# Patient Record
Sex: FEMALE | Race: WHITE | Employment: FULL TIME | URBAN - NONMETROPOLITAN AREA
[De-identification: names, ages, dates, MRNs, and addresses within clinical notes are randomized per-mention and may not be internally consistent; named-entity substitution may affect disease eponyms.]

---

## 2017-08-10 ENCOUNTER — TELEPHONE (OUTPATIENT)
Dept: FAMILY MEDICINE CLINIC | Age: 37
End: 2017-08-10

## 2017-08-18 ENCOUNTER — OFFICE VISIT (OUTPATIENT)
Dept: FAMILY MEDICINE CLINIC | Age: 37
End: 2017-08-18

## 2017-08-18 VITALS
HEART RATE: 70 BPM | DIASTOLIC BLOOD PRESSURE: 86 MMHG | BODY MASS INDEX: 36.25 KG/M2 | WEIGHT: 197 LBS | HEIGHT: 62 IN | OXYGEN SATURATION: 99 % | SYSTOLIC BLOOD PRESSURE: 124 MMHG

## 2017-08-18 DIAGNOSIS — Z00.00 PHYSICAL EXAM: ICD-10-CM

## 2017-08-18 DIAGNOSIS — Z23 NEED FOR TDAP VACCINATION: ICD-10-CM

## 2017-08-18 DIAGNOSIS — Z76.89 ENCOUNTER TO ESTABLISH CARE: Primary | ICD-10-CM

## 2017-08-18 PROCEDURE — 99385 PREV VISIT NEW AGE 18-39: CPT | Performed by: NURSE PRACTITIONER

## 2017-08-18 PROCEDURE — 90715 TDAP VACCINE 7 YRS/> IM: CPT | Performed by: NURSE PRACTITIONER

## 2017-08-18 PROCEDURE — 90471 IMMUNIZATION ADMIN: CPT | Performed by: NURSE PRACTITIONER

## 2017-08-18 ASSESSMENT — ENCOUNTER SYMPTOMS
BLURRED VISION: 0
STRIDOR: 0
PHOTOPHOBIA: 0
DOUBLE VISION: 0
EYE DISCHARGE: 0
EYE PAIN: 0
BLOOD IN STOOL: 0
EYES NEGATIVE: 1
HEARTBURN: 0
NAUSEA: 0
WHEEZING: 0
COUGH: 0
ORTHOPNEA: 0
GASTROINTESTINAL NEGATIVE: 1
VOMITING: 0
SPUTUM PRODUCTION: 0
SHORTNESS OF BREATH: 0
ABDOMINAL PAIN: 0
BACK PAIN: 0
RESPIRATORY NEGATIVE: 1
HEMOPTYSIS: 0
DIARRHEA: 0
CONSTIPATION: 0
SORE THROAT: 1
EYE REDNESS: 0

## 2017-08-18 ASSESSMENT — PATIENT HEALTH QUESTIONNAIRE - PHQ9
1. LITTLE INTEREST OR PLEASURE IN DOING THINGS: 0
2. FEELING DOWN, DEPRESSED OR HOPELESS: 0
SUM OF ALL RESPONSES TO PHQ9 QUESTIONS 1 & 2: 0
SUM OF ALL RESPONSES TO PHQ QUESTIONS 1-9: 0

## 2017-08-21 ENCOUNTER — HOSPITAL ENCOUNTER (OUTPATIENT)
Dept: GENERAL RADIOLOGY | Age: 37
Discharge: HOME | End: 2017-08-21
Attending: NURSE PRACTITIONER | Admitting: NURSE PRACTITIONER

## 2017-08-21 DIAGNOSIS — Z76.89 ENCOUNTER TO ESTABLISH CARE: ICD-10-CM

## 2017-08-21 DIAGNOSIS — Z00.00 PHYSICAL EXAM: ICD-10-CM

## 2017-08-21 LAB
ALBUMIN SERPL-MCNC: 4.6 G/DL (ref 3.4–5)
ALP BLD-CCNC: 72 U/L (ref 40–129)
ALT SERPL-CCNC: 14 U/L (ref 10–40)
ANION GAP SERPL CALCULATED.3IONS-SCNC: 13 MMOL/L (ref 3–16)
AST SERPL-CCNC: 15 U/L (ref 15–37)
BASOPHILS ABSOLUTE: 0 K/UL (ref 0–0.2)
BASOPHILS RELATIVE PERCENT: 0.6 %
BILIRUB SERPL-MCNC: 0.4 MG/DL (ref 0–1)
BILIRUBIN DIRECT: <0.2 MG/DL (ref 0–0.3)
BILIRUBIN, INDIRECT: NORMAL MG/DL (ref 0–1)
BUN BLDV-MCNC: 7 MG/DL (ref 7–20)
CALCIUM SERPL-MCNC: 9.1 MG/DL (ref 8.3–10.6)
CHLORIDE BLD-SCNC: 98 MMOL/L (ref 99–110)
CO2: 25 MMOL/L (ref 21–32)
CREAT SERPL-MCNC: <0.5 MG/DL (ref 0.6–1.1)
EOSINOPHILS ABSOLUTE: 0.1 K/UL (ref 0–0.6)
EOSINOPHILS RELATIVE PERCENT: 0.9 %
GFR AFRICAN AMERICAN: >60
GFR NON-AFRICAN AMERICAN: >60
GLUCOSE BLD-MCNC: 86 MG/DL (ref 70–99)
HCT VFR BLD CALC: 38.7 % (ref 36–48)
HEMOGLOBIN: 13 G/DL (ref 12–16)
LYMPHOCYTES ABSOLUTE: 1.8 K/UL (ref 1–5.1)
LYMPHOCYTES RELATIVE PERCENT: 32.2 %
MCH RBC QN AUTO: 30 PG (ref 26–34)
MCHC RBC AUTO-ENTMCNC: 33.5 G/DL (ref 31–36)
MCV RBC AUTO: 89.3 FL (ref 80–100)
MONOCYTES ABSOLUTE: 0.3 K/UL (ref 0–1.3)
MONOCYTES RELATIVE PERCENT: 4.9 %
NEUTROPHILS ABSOLUTE: 3.5 K/UL (ref 1.7–7.7)
NEUTROPHILS RELATIVE PERCENT: 61.4 %
PDW BLD-RTO: 13.6 % (ref 12.4–15.4)
PLATELET # BLD: 279 K/UL (ref 135–450)
PMV BLD AUTO: 8.5 FL (ref 5–10.5)
POTASSIUM SERPL-SCNC: 3.7 MMOL/L (ref 3.5–5.1)
RBC # BLD: 4.33 M/UL (ref 4–5.2)
SODIUM BLD-SCNC: 136 MMOL/L (ref 136–145)
TOTAL PROTEIN: 7.4 G/DL (ref 6.4–8.2)
TSH SERPL DL<=0.05 MIU/L-ACNC: 2.48 UIU/ML (ref 0.27–4.2)
VITAMIN D 25-HYDROXY: 44.1 NG/ML
WBC # BLD: 5.7 K/UL (ref 4–11)

## 2021-01-15 ENCOUNTER — OFFICE VISIT (OUTPATIENT)
Dept: ORTHOPEDIC SURGERY | Age: 41
End: 2021-01-15
Payer: COMMERCIAL

## 2021-01-15 VITALS — WEIGHT: 190 LBS | HEIGHT: 62 IN | BODY MASS INDEX: 34.96 KG/M2

## 2021-01-15 DIAGNOSIS — M25.521 RIGHT ELBOW PAIN: ICD-10-CM

## 2021-01-15 DIAGNOSIS — S56.911A STRAIN OF RIGHT FOREARM, INITIAL ENCOUNTER: ICD-10-CM

## 2021-01-15 DIAGNOSIS — M25.531 RIGHT WRIST PAIN: Primary | ICD-10-CM

## 2021-01-15 PROCEDURE — 99203 OFFICE O/P NEW LOW 30 MIN: CPT | Performed by: NURSE PRACTITIONER

## 2021-01-15 NOTE — PROGRESS NOTES
Subjective    Chief Complaint   Patient presents with    Wrist Injury     Right wrist, [place] and caught herself on a door. Seymour Montano presents today for evaluation of pain in her right wrist, forearm and elbow. The patient reports that couple weeks ago she fell into a doorway with her right forearm. At that time she went to urgent care, they had no concerns and she was told to pursue conservative treatment. Patient reports that since then her pain has somewhat improved but at times it has remained the same. She has not tried any medication and has not tried any ice or heat. She does feel that she has some weakness in her hand at times but denies any numbness or tingling. She is a  at [employer]. Pain Assessment  Location of Pain: Wrist  Location Modifiers: Right  Severity of Pain: 3  Quality of Pain: Aching  Frequency of Pain: Intermittent  Date Pain First Started: 12/31/20  Aggravating Factors: Stretching, Bending  Limiting Behavior: Yes  Result of Injury: Yes    Patient's medications, allergies, past medical, surgical, social and family histories were reviewed and updated as appropriate. Physical Exam  Constitutional:  Pt well groomed, no acute distress, well developed, no obvious deformities  Vitals:    01/15/21 1744   Weight: 190 lb (86.2 kg)   Height: 5' 2\" (1.575 m)     -Oriented to person, place, and time  -mood and affect are appropriate    EXAM: Right wrist: Pain over radial aspect of wrist, palmar aspect. -There is not deformity  -There  is not ecchymosis  -There is not swelling. -ROM: Full range of motion.  strength 5/5.  -No tenderness with palpation    Phallens sign negative,   Tinnells signnegative,   Medial nerve compression negative,   Finklesteins negative,   CMC Grind test negative,   Piano Key Test negative,    Cozen's sign negative. Right elbow: No tenderness with palpation. Tenderness with palpation throughout the entire forearm. Full range of motion of the elbow. No ecchymosis, erythema or swelling. Contralateral Exam:  -No obvious deformities  -No abrasions or cellulitis noted, NVI   -Full ROM   -No joint laxity  -no palpable tenderness noted    Neurological:   -There is not any complaints of numbness and tingling.    -Motor and sensory is at median, radial and ulnar nerve distributions. -NVI to upper extremities bilaterally. Skin:  No abrasions, lesions, cellulitic changes, obvious deformities noted     Xray: Review of right wrist obtained today shows: No acute fractures or deformities  3 view of right elbow obtained today shows: No acute fractures or deformities    Assessment: Right forearm strain    Plan: The patient was advised to pursue conservative treatment line over-the-counter anti-inflammatories as well as rest, elevation ice. She stated that she likely will not take any medication. She will follow up with our team in 2 weeks if her pain has not improved. No orders of the defined types were placed in this encounter.

## 2024-06-04 ENCOUNTER — APPOINTMENT (OUTPATIENT)
Dept: GENERAL RADIOLOGY | Age: 44
End: 2024-06-04
Payer: COMMERCIAL

## 2024-06-04 ENCOUNTER — HOSPITAL ENCOUNTER (EMERGENCY)
Age: 44
Discharge: HOME | End: 2024-06-04
Attending: EMERGENCY MEDICINE
Payer: COMMERCIAL

## 2024-06-04 VITALS
WEIGHT: 178.2 LBS | HEIGHT: 62 IN | TEMPERATURE: 98.2 F | DIASTOLIC BLOOD PRESSURE: 72 MMHG | SYSTOLIC BLOOD PRESSURE: 113 MMHG | BODY MASS INDEX: 32.79 KG/M2 | OXYGEN SATURATION: 100 % | HEART RATE: 83 BPM | RESPIRATION RATE: 16 BRPM
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
## 2024-06-04 DIAGNOSIS — S80.02XA CONTUSION OF LEFT KNEE, INITIAL ENCOUNTER: ICD-10-CM

## 2024-06-04 DIAGNOSIS — S80.01XA CONTUSION OF RIGHT KNEE, INITIAL ENCOUNTER: ICD-10-CM

## 2024-06-04 DIAGNOSIS — S20.219A CONTUSION OF CHEST WALL, UNSPECIFIED LATERALITY, INITIAL ENCOUNTER: ICD-10-CM

## 2024-06-04 PROCEDURE — 71046 X-RAY EXAM CHEST 2 VIEWS: CPT

## 2024-06-04 PROCEDURE — 99284 EMERGENCY DEPT VISIT MOD MDM: CPT

## 2024-06-04 PROCEDURE — 6370000000 HC RX 637 (ALT 250 FOR IP): Performed by: EMERGENCY MEDICINE

## 2024-06-04 PROCEDURE — 73562 X-RAY EXAM OF KNEE 3: CPT

## 2024-06-04 RX ORDER — TIZANIDINE 4 MG/1
4 TABLET ORAL EVERY 8 HOURS PRN
Qty: 30 TABLET | Refills: 0 | Status: SHIPPED | OUTPATIENT
Start: 2024-06-04

## 2024-06-04 RX ORDER — IBUPROFEN 600 MG/1
600 TABLET ORAL EVERY 6 HOURS PRN
Qty: 30 TABLET | Refills: 0 | Status: SHIPPED | OUTPATIENT
Start: 2024-06-04

## 2024-06-04 RX ADMIN — IBUPROFEN 600 MG: 200 TABLET, FILM COATED ORAL at 18:37

## 2024-06-04 ASSESSMENT — PAIN SCALES - GENERAL: PAINLEVEL_OUTOF10: 9

## 2024-06-04 ASSESSMENT — LIFESTYLE VARIABLES
HOW MANY STANDARD DRINKS CONTAINING ALCOHOL DO YOU HAVE ON A TYPICAL DAY: PATIENT DOES NOT DRINK
HOW OFTEN DO YOU HAVE A DRINK CONTAINING ALCOHOL: NEVER

## 2024-06-04 ASSESSMENT — PAIN - FUNCTIONAL ASSESSMENT: PAIN_FUNCTIONAL_ASSESSMENT: 0-10

## 2024-06-04 NOTE — DISCHARGE INSTRUCTIONS
You can take ibuprofen with or without Tylenol as needed for pain.  The tizanidine is a muscle relaxer, this is particularly good for nighttime pain or muscle spasm.  Tizanidine is a medication that can cause drowsiness, please do not drive or operate heavy machinery, swim or climb to significant heights after taking this medication.  Ice packs or heating pad may help with pain, which ever makes your pain feel better is acceptable to use.  Please follow-up with your regular doctor if symptoms or not improving after 7 to 10 days.  Return to the emergency department for sudden severe worsening of your pain, trouble breathing, significant change in your pain pattern, inability to walk or other concerns.
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
## 2024-06-04 NOTE — PROGRESS NOTES
Pharmacy  Note  - Admission Medication History    List of [id number] medications is complete.   I reviewed Rx fill history via \"Complete Dispense Report\" in Epic, and spoke to patient in person.      The following changes made to [id number] medication list:    ADDED:   1) None    Dose or Frequency CHANGE:  1) None    REMOVED:  1) Ondansetron    NOTEs:  1) Patient reports of not taking take any prescribed or over-the-counter medications, supplements, herbal products, nor topical products.   2) Patient confirms NKDA.      No current outpatient medications     Walt Bauman ([place])  PharmD Candidate [id number]

## 2024-06-04 NOTE — ED NOTES
Reviewed discharge information. Patient verbalized understanding of paperwork, medication, and care instructions. Patient denied any questions.     Ruddy Frye RN  06/04/24 [id number]